# Patient Record
Sex: MALE | Race: WHITE | Employment: OTHER | URBAN - METROPOLITAN AREA
[De-identification: names, ages, dates, MRNs, and addresses within clinical notes are randomized per-mention and may not be internally consistent; named-entity substitution may affect disease eponyms.]

---

## 2017-05-02 ENCOUNTER — OFFICE VISIT (OUTPATIENT)
Dept: CARDIOLOGY CLINIC | Age: 77
End: 2017-05-02

## 2017-05-02 VITALS
BODY MASS INDEX: 24.11 KG/M2 | HEART RATE: 52 BPM | OXYGEN SATURATION: 98 % | RESPIRATION RATE: 16 BRPM | SYSTOLIC BLOOD PRESSURE: 130 MMHG | WEIGHT: 178 LBS | HEIGHT: 72 IN | DIASTOLIC BLOOD PRESSURE: 70 MMHG

## 2017-05-02 DIAGNOSIS — R42 DIZZINESS: Primary | ICD-10-CM

## 2017-05-02 NOTE — PROGRESS NOTES
HISTORY OF PRESENT ILLNESS  Kemal Buchanan is a 64 y.o. male. Patient with h/o HTN, HLD here for evaluation of BP elevation   Past Medical History:   Diagnosis Date    Essential hypertension     Hyperlipidemia      History reviewed. No pertinent surgical history. Social History     Social History    Marital status:      Spouse name: N/A    Number of children: N/A    Years of education: N/A     Occupational History    Not on file. Social History Main Topics    Smoking status: Never Smoker    Smokeless tobacco: Not on file    Alcohol use Not on file    Drug use: Not on file    Sexual activity: Not on file     Other Topics Concern    Not on file     Social History Narrative       HPI  He had one episode of mild dizziness about 1 month ago while out of bed but no recurrence since then. He denies any cp or sob or syncope or palpitations  He is a marathon runner! Ran [place] in 2016 in 5:01!! Review of Systems   Respiratory: Negative. Cardiovascular: Negative. Visit Vitals    /70 (BP 1 Location: Left arm, BP Patient Position: Sitting)    Pulse (!) 52    Resp 16    Ht 6' (1.829 m)    Wt 80.7 kg (178 lb)    SpO2 98%    BMI 24.14 kg/m2       Physical Exam   Neck: No JVD present. Carotid bruit is not present. Cardiovascular: Regular rhythm. Bradycardia present. Pulmonary/Chest: Effort normal and breath sounds normal.   Abdominal: Soft. Musculoskeletal: He exhibits no edema. Psychiatric: He has a normal mood and affect. Current Outpatient Prescriptions on File Prior to Visit   Medication Sig Dispense Refill    pravastatin (PRAVACHOL) 40 mg tablet Take 40 mg by mouth nightly.  finasteride (PROSCAR) 5 mg tablet Take 5 mg by mouth daily.  latanoprost (XALATAN) 0.005 % ophthalmic solution Administer 1 Drop to both eyes nightly.  multivitamin (ONE A DAY) tablet Take 1 Tab by mouth daily.       sumatriptan (IMITREX) 50 mg tablet Take 50 mg by mouth once as needed. No current facility-administered medications on file prior to visit. ASSESSMENT and PLAN  HTN: I have reviewed his BP log and for the most part is <140 with one episode of 144, now completely asymptomatic. We have also discussed potential for conduction disease. His ECG shows SB at 49, IVCD and no changes from 2014 ECG!  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

## 2017-05-02 NOTE — MR AVS SNAPSHOT
Visit Information Date & Time Provider Department Dept. Phone Encounter #  
 5/2/2017 10:00 AM Deanna Hicks MD [facility] [phone] [id number] Your Appointments 5/2/2017 10:00 AM  
ESTABLISHED PATIENT with Deanna Hicks MD  
[facility] ([address]) Appt Note: check up; check up/Pt requested Dr. Hegla Gomez 4/24 600 [address] [place] [address]  
[address] [facility] [address] [id number] Upcoming Health Maintenance Date Due DTaP/Tdap/Td series (1 - Tdap) 12/14/1959 ZOSTER VACCINE AGE 60> 12/14/1998 GLAUCOMA SCREENING Q2Y 12/14/2003 Pneumococcal 65+ Low/Medium Risk (1 of 2 - PCV13) 12/14/2003 MEDICARE YEARLY EXAM 12/14/2003 INFLUENZA AGE 9 TO ADULT 8/1/2017 Allergies as of 5/2/2017  Review Complete On: 11/13/2014 By: Arianne Bermeo MD  
 No Known Allergies Current Immunizations  Never Reviewed No immunizations on file. Not reviewed this visit Vitals Smoking Status Never Smoker Your Updated Medication List  
  
   
This list is accurate as of: 5/1/17  5:36 PM.  Always use your most recent med list.  
  
  
  
  
 finasteride 5 mg tablet Commonly known as:  PROSCAR Take 5 mg by mouth daily. latanoprost 0.005 % ophthalmic solution Commonly known as:  Allyssa Nap Administer 1 Drop to both eyes nightly. multivitamin tablet Commonly known as:  ONE A DAY Take 1 Tab by mouth daily. PRAVACHOL 40 mg tablet Generic drug:  pravastatin Take 40 mg by mouth nightly. SUMAtriptan 50 mg tablet Commonly known as:  IMITREX Take 50 mg by mouth once as needed. Introducing [facility]! Hannah Cortez introduces [employer] patient portal. Now you can access parts of your medical record, email your doctor's office, and request medication refills online. 1. In your internet browser, go to [email or web address] 2. Click on the First Time User? Click Here link in the Sign In box. You will see the New Member Sign Up page. 3. Enter your eoSemi Access Code exactly as it appears below. You will not need to use this code after youve completed the sign-up process. If you do not sign up before the expiration date, you must request a new code. · [employer] Access Code: [email or web address] Expires: 7/30/2017  5:36 PM 
 
4. Enter the last four digits of your Social Security Number (xxxx) and Date of Birth (mm/dd/yyyy) as indicated and click Submit. You will be taken to the next sign-up page. 5. Create a Enclarityt ID. This will be your eoSemi login ID and cannot be changed, so think of one that is secure and easy to remember. 6. Create a e[employer]i password. You can change your password at any time. 7. Enter your Password Reset Question and Answer. This can be used at a later time if you forget your password. 8. Enter your e-mail address. You will receive e-mail notification when new information is available in [address] 9. Click Sign Up. You can now view and download portions of your medical record. 10. Click the Download Summary menu link to download a portable copy of your medical information. If you have questions, please visit the Frequently Asked Questions section of the e[employer]i website. Remember, eoSemi is NOT to be used for urgent needs. For medical emergencies, dial 911. Now available from your iPhone and Android! Please provide this summary of care documentation to your next provider. Your primary care clinician is listed as Claudette Abdullahi. If you have any questions after today's visit, please call [phone].

## 2017-08-15 ENCOUNTER — HOSPITAL ENCOUNTER (OUTPATIENT)
Dept: GENERAL RADIOLOGY | Age: 77
Discharge: HOME | End: 2017-08-15
Payer: MEDICARE

## 2017-08-15 DIAGNOSIS — R13.10 DYSPHAGIA: ICD-10-CM

## 2017-08-15 PROCEDURE — 74220 X-RAY XM ESOPHAGUS 1CNTRST: CPT

## 2017-10-05 ENCOUNTER — HOSPITAL ENCOUNTER (EMERGENCY)
Age: 77
Discharge: HOME | End: 2017-10-05
Attending: FAMILY MEDICINE

## 2017-10-05 VITALS
WEIGHT: 174 LBS | TEMPERATURE: 98 F | RESPIRATION RATE: 19 BRPM | HEART RATE: 60 BPM | SYSTOLIC BLOOD PRESSURE: 143 MMHG | OXYGEN SATURATION: 97 % | HEIGHT: 72 IN | DIASTOLIC BLOOD PRESSURE: 79 MMHG | BODY MASS INDEX: 23.57 KG/M2

## 2017-10-05 DIAGNOSIS — H00.025 HORDEOLUM INTERNUM LEFT LOWER EYELID: Primary | ICD-10-CM

## 2017-10-05 RX ORDER — CIPROFLOXACIN 500 MG/1
500 TABLET ORAL 2 TIMES DAILY
Qty: 20 TAB | Refills: 0 | Status: SHIPPED | OUTPATIENT
Start: 2017-10-05 | End: 2017-10-15

## 2017-10-05 NOTE — UC PROVIDER NOTE
Patient is a 64 y.o. male presenting with eye problem. The history is provided by the patient. Eye Problem    This is a new problem. The current episode started more than 2 days ago. The left eye is affected. The injury mechanism was none. There is no history of trauma to the eye. There is no known exposure to pink eye. He does not wear contacts. Associated symptoms include eye redness. Pertinent negatives include no blurred vision, no foreign body sensation and no fever. He has tried nothing for the symptoms. Past Medical History:   Diagnosis Date    Essential hypertension     Hyperlipidemia         History reviewed. No pertinent surgical history. History reviewed. No pertinent family history. Social History     Social History    Marital status:      Spouse name: N/A    Number of children: N/A    Years of education: N/A     Occupational History    Not on file. Social History Main Topics    Smoking status: Never Smoker    Smokeless tobacco: Never Used    Alcohol use Not on file    Drug use: Not on file    Sexual activity: Not on file     Other Topics Concern    Not on file     Social History Narrative                ALLERGIES: Review of patient's allergies indicates no known allergies. Review of Systems   Constitutional: Negative for fever. Eyes: Positive for redness. Negative for blurred vision. Neurological: Negative for headaches. Vitals:    10/05/17 0925   BP: 143/79   Pulse: 60   Resp: 19   Temp: 98 °F (36.7 °C)   SpO2: 97%   Weight: 78.9 kg (174 lb)   Height: 6' (1.829 m)       Physical Exam   Constitutional: He is oriented to person, place, and time. He appears well-developed and well-nourished. HENT:   Head: Normocephalic and atraumatic. Eyes: Conjunctivae and EOM are normal.   Medial side of left lower lid has internal stye   Cardiovascular: Normal rate and regular rhythm.     Pulmonary/Chest: Effort normal and breath sounds normal.   Neurological: He is alert and oriented to person, place, and time. Skin: Skin is warm and dry. Psychiatric: He has a normal mood and affect. His behavior is normal. Judgment and thought content normal.   Nursing note and vitals reviewed. MDM     Differential Diagnosis; Clinical Impression; Plan:     CLINICAL IMPRESSION:  Hordeolum internum left lower eyelid  (primary encounter diagnosis)    Plan:  1. cipro  2.   3.   Risk of Significant Complications, Morbidity, and/or Mortality:   Presenting problems: Moderate  Diagnostic procedures: Moderate  Management options:   Moderate  Progress:   Patient progress:  Stable      Procedures

## 2017-10-05 NOTE — DISCHARGE INSTRUCTIONS
Styes and Chalazia: Care Instructions  Your Care Instructions    Styes and chalazia (say \"anr-TAK-imm-uh\") are both conditions that can cause swelling of the eyelid. A stye is an infection in the root of an eyelash. The infection causes a tender red lump on the edge of the eyelid. The infection can spread until the whole eyelid becomes red and inflamed. Styes usually break open, and a tiny amount of pus drains. They usually clear up on their own in about a week, but they sometimes need treatment with antibiotics. A chalazion is a lump or cyst in the eyelid (chalazion is singular; chalazia is plural). It is caused by swelling and inflammation of deep oil glands inside the eyelid. Chalazia are usually not infected. They can take a few months to heal.  If a chalazion becomes more swollen and painful or does not go away, you may need to have it drained by your doctor. Follow-up care is a key part of your treatment and safety. Be sure to make and go to all appointments, and call your doctor if you are having problems. It's also a good idea to know your test results and keep a list of the medicines you take. How can you care for yourself at home? · Do not rub your eyes. Do not squeeze or try to open a stye or chalazion. · To help a stye or chalazion heal faster:  ¨ Put a warm, moist compress on your eye for 5 to 10 minutes, 3 to 6 times a day. Heat often brings a stye to a point where it drains on its own. Keep in mind that warm compresses will often increase swelling a little at first.  ¨ Do not use hot water or heat a wet cloth in a microwave oven. The compress may get too hot and can burn the eyelid. · Always wash your hands before and after you use a compress or touch your eyes. · If the doctor gave you antibiotic drops or ointment, use the medicine exactly as directed. Use the medicine for as long as instructed, even if your eye starts to feel better.   · To put in eyedrops or ointment:  ¨ Tilt your head

## 2018-01-22 ENCOUNTER — HOSPITAL ENCOUNTER (EMERGENCY)
Age: 78
Discharge: STILL ADMITTED OR ADMINISTRATIVE | End: 2018-01-22
Attending: FAMILY MEDICINE

## 2018-08-10 ENCOUNTER — OFFICE VISIT (OUTPATIENT)
Dept: URGENT CARE | Age: 78
End: 2018-08-10

## 2018-08-10 VITALS
WEIGHT: 175.9 LBS | BODY MASS INDEX: 23.83 KG/M2 | DIASTOLIC BLOOD PRESSURE: 75 MMHG | HEIGHT: 72 IN | SYSTOLIC BLOOD PRESSURE: 146 MMHG | TEMPERATURE: 97.7 F | HEART RATE: 50 BPM | RESPIRATION RATE: 16 BRPM | OXYGEN SATURATION: 98 %

## 2018-08-10 DIAGNOSIS — S46.009A ROTATOR CUFF INJURY, INITIAL ENCOUNTER: Primary | ICD-10-CM

## 2018-08-10 DIAGNOSIS — M25.512 ACUTE PAIN OF LEFT SHOULDER: ICD-10-CM

## 2018-08-10 RX ORDER — LANOLIN ALCOHOL/MO/W.PET/CERES
1 CREAM (GRAM) TOPICAL
COMMUNITY

## 2018-08-10 RX ORDER — OMEPRAZOLE 20 MG/1
40 CAPSULE, DELAYED RELEASE ORAL DAILY
COMMUNITY

## 2018-08-10 RX ORDER — DUTASTERIDE 0.5 MG/1
CAPSULE, LIQUID FILLED ORAL
COMMUNITY
Start: 2017-09-18

## 2018-08-10 RX ORDER — LOSARTAN POTASSIUM 50 MG/1
TABLET ORAL DAILY
COMMUNITY

## 2018-08-10 NOTE — MR AVS SNAPSHOT
[address] [facility] [address] 
[phone] Patient: Sharon Buchanan MRN: [id number] CJF:[id number] Visit Information Date & Time Provider Department Dept. Phone Encounter #  
 8/10/2018  3:30 PM [address] [facility] [phone] [id number] Upcoming Health Maintenance Date Due DTaP/Tdap/Td series (1 - Tdap) 12/14/1959 ZOSTER VACCINE AGE 60> 10/14/1998 GLAUCOMA SCREENING Q2Y 12/14/2003 Pneumococcal 65+ Low/Medium Risk (1 of 2 - PCV13) 12/14/2003 MEDICARE YEARLY EXAM 3/20/2018 Influenza Age 5 to Adult 8/1/2018 Allergies as of 8/10/2018  Review Complete On: 8/10/2018 By: Allison Wiggins NP No Known Allergies Current Immunizations  Never Reviewed No immunizations on file. Not reviewed this visit You Were Diagnosed With   
  
 Codes Comments Rotator cuff injury, initial encounter    -  Primary ICD-10-CM: Z94.535M ICD-9-CM: 959.2 Acute pain of left shoulder     ICD-10-CM: M25.512 ICD-9-CM: 719.41 Vitals BP Pulse Temp Resp Height(growth percentile) Weight(growth percentile) 146/75 (!) 50 97.7 °F (36.5 °C) 16 6' (1.829 m) 175 lb 14.4 oz (79.8 kg) SpO2 BMI Smoking Status 98% 23.86 kg/m2 Never Smoker BMI and BSA Data Body Mass Index Body Surface Area  
 23.86 kg/m 2 2.01 m 2 Preferred Pharmacy Pharmacy Name Phone [facility], [address] [place] [address] AT [address] [place] [address] Drive [phone] Your Updated Medication List  
  
   
This list is accurate as of 8/10/18  4:52 PM.  Always use your most recent med list.  
  
  
  
  
 dutasteride 0.5 mg capsule Commonly known as:  AVODART  
  
 finasteride 5 mg tablet Commonly known as:  PROSCAR Take 5 mg by mouth daily. glucosamine-chondroitin 500-400 mg Cap Commonly known as:  20 [facility] Take 1 Tab by mouth. latanoprost 0.005 % ophthalmic solution Commonly known as:  Rupesh Boyd Administer 1 Drop to both eyes nightly. losartan 50 mg tablet Commonly known as:  COZAAR Take  by mouth daily. multivitamin tablet Commonly known as:  ONE A DAY Take 1 Tab by mouth daily. omeprazole 20 mg capsule Commonly known as:  PRILOSEC Take 20 mg by mouth daily. PRAVACHOL 40 mg tablet Generic drug:  pravastatin Take 40 mg by mouth nightly. SUMAtriptan 50 mg tablet Commonly known as:  IMITREX Take 50 mg by mouth once as needed. To-Do List   
 08/10/2018 Imaging:  XR SHOULDER LT AP/LAT MIN 2 V Patient Instructions Follow up with [address] [place] [address] without some improvement over the next 1 week. Rotator Cuff: Exercises Your Care Instructions Here are some examples of typical rehabilitation exercises for your condition. Start each exercise slowly. Ease off the exercise if you start to have pain. Your doctor or physical therapist will tell you when you can start these exercises and which ones will work best for you. How to do the exercises Pendulum swing If you have pain in your back, do not do this exercise. 1. Hold on to a table or the back of a chair with your good arm. Then bend forward a little and let your sore arm hang straight down. This exercise does not use the arm muscles. Rather, use your legs and your hips to create movement that makes your arm swing freely. 2. Use the movement from your hips and legs to guide the slightly swinging arm back and forth like a pendulum (or elephant trunk). Then guide it in circles that start small (about the size of a dinner plate). Make the circles a bit larger each day, as your pain allows. 3. Do this exercise for 5 minutes, 5 to 7 times each day. 4. As you have less pain, try bending over a little farther to do this exercise. This will increase the amount of movement at your shoulder. Posterior stretching exercise 1. Hold the elbow of your injured arm with your other hand. 2. Use your hand to pull your injured arm gently up and across your body. You will feel a gentle stretch across the back of your injured shoulder. 3. Hold for at least 15 to 30 seconds. Then slowly lower your arm. 4. Repeat 2 to 4 times. Up-the-back stretch Your doctor or physical therapist may want you to wait to do this stretch until you have regained most of your range of motion and strength. You can do this stretch in different ways. Hold any of these stretches for at least 15 to 30 seconds. Repeat them 2 to 4 times. 1. Put your hand in your back pocket. Let it rest there to stretch your shoulder. 2. With your other hand, hold your injured arm (palm outward) behind your back by the wrist. Pull your arm up gently to stretch your shoulder. 3. Next, put a towel over your other shoulder. Put the hand of your injured arm behind your back. Now hold the back end of the towel. With the other hand, hold the front end of the towel in front of your body. Pull gently on the front end of the towel. This will bring your hand farther up your back to stretch your shoulder. Overhead stretch 1. Standing about an arm's length away, grasp onto a solid surface. You could use a countertop, a doorknob, or the back of a sturdy chair. 2. With your knees slightly bent, bend forward with your arms straight. Lower your upper body, and let your shoulders stretch. 3. As your shoulders are able to stretch farther, you may need to take a step or two backward. 4. Hold for at least 15 to 30 seconds. Then stand up and relax. If you had stepped back during your stretch, step forward so you can keep your hands on the solid surface. 5. Repeat 2 to 4 times. Shoulder flexion (lying down) To make a wand for this exercise, use a piece of PVC pipe or a broom handle with the broom removed.  Make the wand about a foot wider than your shoulders. 1. Lie on your back, holding a wand with both hands. Your palms should face down as you hold the wand. 2. Keeping your elbows straight, slowly raise your arms over your head. Raise them until you feel a stretch in your shoulders, upper back, and chest. 
3. Hold for 15 to 30 seconds. 4. Repeat 2 to 4 times. Shoulder rotation (lying down) To make a wand for this exercise, use a piece of PVC pipe or a broom handle with the broom removed. Make the wand about a foot wider than your shoulders. 1. Lie on your back. Hold a wand with both hands with your elbows bent and palms up. 2. Keep your elbows close to your body, and move the wand across your body toward the sore arm. 3. Hold for 8 to 12 seconds. 4. Repeat 2 to 4 times. Wall climbing (to the side) Avoid any movement that is straight to your side, and be careful not to arch your back. Your arm should stay about 30 degrees to the front of your side. 1. Stand with your side to a wall so that your fingers can just touch it at an angle about 30 degrees toward the front of your body. 2. Walk the fingers of your injured arm up the wall as high as pain permits. Try not to shrug your shoulder up toward your ear as you move your arm up. 3. Hold that position for a count of at least 15 to 20. 
4. Walk your fingers back down to the starting position. 5. Repeat at least 2 to 4 times. Try to reach higher each time. Wall climbing (to the front) During this stretching exercise, be careful not to arch your back. 1. Face a wall, and stand so your fingers can just touch it. 2. Keeping your shoulder down, walk the fingers of your injured arm up the wall as high as pain permits. (Don't shrug your shoulder up toward your ear.) 3. Hold your arm in that position for at least 15 to 30 seconds. 4. Slowly walk your fingers back down to where you started. 5. Repeat at least 2 to 4 times. Try to reach higher each time. Shoulder blade squeeze 1. Stand with your arms at your sides, and squeeze your shoulder blades together. Do not raise your shoulders up as you squeeze. 2. Hold 6 seconds. 3. Repeat 8 to 12 times. Scapular exercise: Arm reach 1. Lie flat on your back. This exercise is a very slight motion that starts with your arms raised (elbows straight, arms straight). 2. From this position, reach higher toward the kristyn or ceiling. Keep your elbows straight. All motion should be from your shoulder blade only. 3. Relax your arms back to where you started. 4. Repeat 8 to 12 times. Arm raise to the side During this strengthening exercise, your arm should stay about 30 degrees to the front of your side. 1. Slowly raise your injured arm to the side, with your thumb facing up. Raise your arm 60 degrees at the most (shoulder level is 90 degrees). 2. Hold the position for 3 to 5 seconds. Then lower your arm back to your side. If you need to, bring your \"good\" arm across your body and place it under the elbow as you lower your injured arm. Use your good arm to keep your injured arm from dropping down too fast. 
3. Repeat 8 to 12 times. 4. When you first start out, don't hold any extra weight in your hand. As you get stronger, you may use a 1-pound to 2-pound dumbbell or a small can of food. Shoulder flexor and extensor exercise These are isometric exercises. That means you contract your muscles without actually moving. 1. Push forward (flex): Stand facing a wall or doorjamb, about 6 inches or less back. Hold your injured arm against your body. Make a closed fist with your thumb on top. Then gently push your hand forward into the wall with about 25% to 50% of your strength. Don't let your body move backward as you push. Hold for about 6 seconds. Relax for a few seconds. Repeat 8 to 12 times. 2. Push backward (extend): Stand with your back flat against a wall.  Your upper arm should be against the wall, with your elbow bent 90 degrees (your hand straight ahead). Push your elbow gently back against the wall with about 25% to 50% of your strength. Don't let your body move forward as you push. Hold for about 6 seconds. Relax for a few seconds. Repeat 8 to 12 times. Scapular exercise: Wall push-ups This exercise is best done with your fingers somewhat turned out, rather than straight up and down. 1. Stand facing a wall, about 12 inches to 18 inches away. 2. Place your hands on the wall at shoulder height. 3. Slowly bend your elbows and bring your face to the wall. Keep your back and hips straight. 4. Push back to where you started. 5. Repeat 8 to 12 times. 6. When you can do this exercise against a wall comfortably, you can try it against a counter. You can then slowly progress to the end of a couch, then to a sturdy chair, and finally to the floor. Scapular exercise: Retraction For this exercise, you will need elastic exercise material, such as surgical tubing or Thera-Band. 1. Put the band around a solid object at about waist level. (A bedpost will work well.) Each hand should hold an end of the band. 2. With your elbows at your sides and bent to 90 degrees, pull the band back. Your shoulder blades should move toward each other. Then move your arms back where you started. 3. Repeat 8 to 12 times. 4. If you have good range of motion in your shoulders, try this exercise with your arms lifted out to the sides. Keep your elbows at a 90-degree angle. Raise the elastic band up to about shoulder level. Pull the band back to move your shoulder blades toward each other. Then move your arms back where you started. Internal rotator strengthening exercise 1. Start by tying a piece of elastic exercise material to a doorknob. You can use surgical tubing or Thera-Band. 2. Stand or sit with your shoulder relaxed and your elbow bent 90 degrees. Your upper arm should rest comfortably against your side.  Squeeze a rolled towel between your elbow and your body for comfort. This will help keep your arm at your side. 3. Hold one end of the elastic band in the hand of the painful arm. 4. Slowly rotate your forearm toward your body until it touches your belly. Slowly move it back to where you started. 5. Keep your elbow and upper arm firmly tucked against the towel roll or at your side. 6. Repeat 8 to 12 times. External rotator strengthening exercise 1. Start by tying a piece of elastic exercise material to a doorknob. You can use surgical tubing or Thera-Band. (You may also hold one end of the band in each hand.) 2. Stand or sit with your shoulder relaxed and your elbow bent 90 degrees. Your upper arm should rest comfortably against your side. Squeeze a rolled towel between your elbow and your body for comfort. This will help keep your arm at your side. 3. Hold one end of the elastic band with the hand of the painful arm. 4. Start with your forearm across your belly. Slowly rotate the forearm out away from your body. Keep your elbow and upper arm tucked against the towel roll or the side of your body until you begin to feel tightness in your shoulder. Slowly move your arm back to where you started. 5. Repeat 8 to 12 times. Follow-up care is a key part of your treatment and safety. Be sure to make and go to all appointments, and call your doctor if you are having problems. It's also a good idea to know your test results and keep a list of the medicines you take. Where can you learn more? Go to http://maria ines-katherine.info/. Enter Magalie Clifton in the search box to learn more about \"Rotator Cuff: Exercises. \" Current as of: November 29, 2017 Content Version: 11.7 © 1994-3914 [employer]. Care instructions adapted under license by [employer] (which disclaims liability or warranty for this information).  If you have questions about a medical condition or this instruction, always ask your healthcare professional. [address] any warranty or liability for your use of this information. Rotator Cuff Rehabilitation What is rotator cuff rehabilitation? Rotator cuff rehabilitation is a series of exercises you do after your surgery. It helps you get back your shoulder's range of motion and strength. You will work with your doctor and physical therapist to plan this exercise program. To get the best results, you need to do the exercises correctly and as often as your doctor tells you. Before you start any exercises, talk with your doctor or physical therapist. It is important that you know exactly how to do the exercises. Stop and call your doctor if you are not sure that you are doing the exercises correctly or if you have any pain. Hearing clicks and pops during exercise is not always cause for concern, but a grinding feeling may mean a more serious problem. Ice your shoulder after exercising if it is sore. Follow-up care is a key part of your treatment and safety. Be sure to make and go to all appointments, and call your doctor if you are having problems. It's also a good idea to know your test results and keep a list of the medicines you take. Stretching exercises Do not start doing stretching exercises until your doctor says you can. Your doctor will tell you which exercises to do, and how often and how long to do them. Posterior stretch · Stand upright with your feet shoulder-width apart. · Put the hand of your affected arm on the opposite shoulder, and hold the elbow to your body. · Then, using your good arm, hold the elbow of your affected arm and move it gently up, away from, and across your body. External rotation · Hold a lightweight stick or cheng in your good arm. It should be about 2 feet long. A curtain cheng may work well. · Lie on your back with your elbows next to your sides.  Rest the elbow of your affected arm on a small pillow or folded towel. · Set your arms so that the elbows are bent at a 90-degree angle, like the letter \"L. \" Your hands will point straight up. · Hold the stick with both hands. Use your good arm to push the stick toward the affected arm so the affected arm moves outward, away from your body. Stop when you feel the arm stretching. Strength exercises Do not start strength exercises until your doctor says you can. Usually, this is at least 6 to 8 weeks after surgery. Your doctor will tell you how often and how long to do the exercises. Arm raises to the side · Stand upright with your feet shoulder-width apart and your affected arm at your side. · Slowly raise your injured arm to the side, with your thumb facing up. Raise your arm 60 degrees at the most (shoulder level is 90 degrees). · After holding the position for 3 to 5 seconds, lower your arm back to your side. If you need to, bring your \"good\" arm across your body and place it under the elbow as you lower your injured arm. Use your good arm to keep your injured arm from dropping down too fast during the downward motion. · Repeat 8 to 12 times. · When you first start out, don't hold any additional weight in your hand. As your strength improves, you may use a 1- to 2-pound dumbbell or a small can of food. Shoulder flexor · Stand facing a wall. Your body should be about 6 inches away from the wall. · Keep your affected arm and elbow to your side, and bend your elbow so that your arm is pointing toward the wall. · Make a closed fist with your thumb on top. · Push your hand into the wall and hold it for 6 seconds. Push with 25% to 50% of the force you have. Shoulder extension · Stand with your back flat against a wall. · Keep your affected arm and elbow at your side, and bend your elbow so that your upper arm is against the wall and your lower arm is pointing straight ahead. Make a closed fist with your thumb on top. · Push your elbow gently back against the wall, holding for 6 seconds. Push with 25% to 50% of the force you have. Where can you learn more? Go to http://maria ines-katherine.info/. Enter N047 in the search box to learn more about \"Rotator Cuff Rehabilitation. \" Current as of: November 29, 2017 Content Version: 11.7 © 6743-7773 [email or web address]. Care instructions adapted under license by [employer] (which disclaims liability or warranty for this information). If you have questions about a medical condition or this instruction, always ask your healthcare professional. [address] any warranty or liability for your use of this information. Introducing [facility]! Gael Lim introduces [employer]park patient portal. Now you can access parts of your medical record, email your doctor's office, and request medication refills online. 1. In your internet browser, go to [email or web address] 2. Click on the First Time User? Click Here link in the Sign In box. You will see the New Member Sign Up page. 3. Enter your xoompark Access Code exactly as it appears below. You will not need to use this code after youve completed the sign-up process. If you do not sign up before the expiration date, you must request a new code. · [employer] Access Code: [email or web address] Expires: 11/8/2018  4:51 PM 
 
4. Enter the last four digits of your Social Security Number (xxxx) and Date of Birth (mm/dd/yyyy) as indicated and click Submit. You will be taken to the next sign-up page. 5. Create a [employer] ID. This will be your xoompark login ID and cannot be changed, so think of one that is secure and easy to remember. 6. Create a xoompark password. You can change your password at any time. 7. Enter your Password Reset Question and Answer. This can be used at a later time if you forget your password. 8. Enter your e-mail address. You will receive e-mail notification when new information is available in [address] 9. Click Sign Up. You can now view and download portions of your medical record. 10. Click the Download Summary menu link to download a portable copy of your medical information. If you have questions, please visit the Frequently Asked Questions section of the [employer] website. Remember, [employer] is NOT to be used for urgent needs. For medical emergencies, dial 911. Now available from your iPhone and Android! Please provide this summary of care documentation to your next provider. Your primary care clinician is listed as Donald Oneill. If you have any questions after today's visit, please call [phone].

## 2018-08-10 NOTE — PATIENT INSTRUCTIONS
Follow up with [address] [place] [address] without some improvement over the next 1 week. Rotator Cuff: Exercises  Your Care Instructions  Here are some examples of typical rehabilitation exercises for your condition. Start each exercise slowly. Ease off the exercise if you start to have pain. Your doctor or physical therapist will tell you when you can start these exercises and which ones will work best for you. How to do the exercises  Pendulum swing    If you have pain in your back, do not do this exercise. 1. Hold on to a table or the back of a chair with your good arm. Then bend forward a little and let your sore arm hang straight down. This exercise does not use the arm muscles. Rather, use your legs and your hips to create movement that makes your arm swing freely. 2. Use the movement from your hips and legs to guide the slightly swinging arm back and forth like a pendulum (or elephant trunk). Then guide it in circles that start small (about the size of a dinner plate). Make the circles a bit larger each day, as your pain allows. 3. Do this exercise for 5 minutes, 5 to 7 times each day. 4. As you have less pain, try bending over a little farther to do this exercise. This will increase the amount of movement at your shoulder. Posterior stretching exercise    1. Hold the elbow of your injured arm with your other hand. 2. Use your hand to pull your injured arm gently up and across your body. You will feel a gentle stretch across the back of your injured shoulder. 3. Hold for at least 15 to 30 seconds. Then slowly lower your arm. 4. Repeat 2 to 4 times. Up-the-back stretch    Your doctor or physical therapist may want you to wait to do this stretch until you have regained most of your range of motion and strength. You can do this stretch in different ways. Hold any of these stretches for at least 15 to 30 seconds. Repeat them 2 to 4 times. 1. Put your hand in your back pocket.  Let it rest there to stretch your shoulder. 2. With your other hand, hold your injured arm (palm outward) behind your back by the wrist. Pull your arm up gently to stretch your shoulder. 3. Next, put a towel over your other shoulder. Put the hand of your injured arm behind your back. Now hold the back end of the towel. With the other hand, hold the front end of the towel in front of your body. Pull gently on the front end of the towel. This will bring your hand farther up your back to stretch your shoulder. Overhead stretch    1. Standing about an arm's length away, grasp onto a solid surface. You could use a countertop, a doorknob, or the back of a sturdy chair. 2. With your knees slightly bent, bend forward with your arms straight. Lower your upper body, and let your shoulders stretch. 3. As your shoulders are able to stretch farther, you may need to take a step or two backward. 4. Hold for at least 15 to 30 seconds. Then stand up and relax. If you had stepped back during your stretch, step forward so you can keep your hands on the solid surface. 5. Repeat 2 to 4 times. Shoulder flexion (lying down)    To make a wand for this exercise, use a piece of PVC pipe or a broom handle with the broom removed. Make the wand about a foot wider than your shoulders. 1. Lie on your back, holding a wand with both hands. Your palms should face down as you hold the wand. 2. Keeping your elbows straight, slowly raise your arms over your head. Raise them until you feel a stretch in your shoulders, upper back, and chest.  3. Hold for 15 to 30 seconds. 4. Repeat 2 to 4 times. Shoulder rotation (lying down)    To make a wand for this exercise, use a piece of PVC pipe or a broom handle with the broom removed. Make the wand about a foot wider than your shoulders. 1. Lie on your back. Hold a wand with both hands with your elbows bent and palms up.   2. Keep your elbows close to your body, and move the wand across your body toward the sore arm.  3. Hold for 8 to 12 seconds. 4. Repeat 2 to 4 times. Wall climbing (to the side)    Avoid any movement that is straight to your side, and be careful not to arch your back. Your arm should stay about 30 degrees to the front of your side. 1. Stand with your side to a wall so that your fingers can just touch it at an angle about 30 degrees toward the front of your body. 2. Walk the fingers of your injured arm up the wall as high as pain permits. Try not to shrug your shoulder up toward your ear as you move your arm up. 3. Hold that position for a count of at least 15 to 20.  4. Walk your fingers back down to the starting position. 5. Repeat at least 2 to 4 times. Try to reach higher each time. Wall climbing (to the front)    During this stretching exercise, be careful not to arch your back. 1. Face a wall, and stand so your fingers can just touch it. 2. Keeping your shoulder down, walk the fingers of your injured arm up the wall as high as pain permits. (Don't shrug your shoulder up toward your ear.)  3. Hold your arm in that position for at least 15 to 30 seconds. 4. Slowly walk your fingers back down to where you started. 5. Repeat at least 2 to 4 times. Try to reach higher each time. Shoulder blade squeeze    1. Stand with your arms at your sides, and squeeze your shoulder blades together. Do not raise your shoulders up as you squeeze. 2. Hold 6 seconds. 3. Repeat 8 to 12 times. Scapular exercise: Arm reach    1. Lie flat on your back. This exercise is a very slight motion that starts with your arms raised (elbows straight, arms straight). 2. From this position, reach higher toward the kristyn or ceiling. Keep your elbows straight. All motion should be from your shoulder blade only. 3. Relax your arms back to where you started. 4. Repeat 8 to 12 times. Arm raise to the side    During this strengthening exercise, your arm should stay about 30 degrees to the front of your side.   1. Slowly raise your injured arm to the side, with your thumb facing up. Raise your arm 60 degrees at the most (shoulder level is 90 degrees). 2. Hold the position for 3 to 5 seconds. Then lower your arm back to your side. If you need to, bring your \"good\" arm across your body and place it under the elbow as you lower your injured arm. Use your good arm to keep your injured arm from dropping down too fast.  3. Repeat 8 to 12 times. 4. When you first start out, don't hold any extra weight in your hand. As you get stronger, you may use a 1-pound to 2-pound dumbbell or a small can of food. Shoulder flexor and extensor exercise    These are isometric exercises. That means you contract your muscles without actually moving. 1. Push forward (flex): Stand facing a wall or doorjamb, about 6 inches or less back. Hold your injured arm against your body. Make a closed fist with your thumb on top. Then gently push your hand forward into the wall with about 25% to 50% of your strength. Don't let your body move backward as you push. Hold for about 6 seconds. Relax for a few seconds. Repeat 8 to 12 times. 2. Push backward (extend): Stand with your back flat against a wall. Your upper arm should be against the wall, with your elbow bent 90 degrees (your hand straight ahead). Push your elbow gently back against the wall with about 25% to 50% of your strength. Don't let your body move forward as you push. Hold for about 6 seconds. Relax for a few seconds. Repeat 8 to 12 times. Scapular exercise: Wall push-ups    This exercise is best done with your fingers somewhat turned out, rather than straight up and down. 1. Stand facing a wall, about 12 inches to 18 inches away. 2. Place your hands on the wall at shoulder height. 3. Slowly bend your elbows and bring your face to the wall. Keep your back and hips straight. 4. Push back to where you started. 5. Repeat 8 to 12 times.   6. When you can do this exercise against a wall comfortably, you can try it against a counter. You can then slowly progress to the end of a couch, then to a sturdy chair, and finally to the floor. Scapular exercise: Retraction    For this exercise, you will need elastic exercise material, such as surgical tubing or Thera-Band. 1. Put the band around a solid object at about waist level. (A bedpost will work well.) Each hand should hold an end of the band. 2. With your elbows at your sides and bent to 90 degrees, pull the band back. Your shoulder blades should move toward each other. Then move your arms back where you started. 3. Repeat 8 to 12 times. 4. If you have good range of motion in your shoulders, try this exercise with your arms lifted out to the sides. Keep your elbows at a 90-degree angle. Raise the elastic band up to about shoulder level. Pull the band back to move your shoulder blades toward each other. Then move your arms back where you started. Internal rotator strengthening exercise    1. Start by tying a piece of elastic exercise material to a doorknob. You can use surgical tubing or Thera-Band. 2. Stand or sit with your shoulder relaxed and your elbow bent 90 degrees. Your upper arm should rest comfortably against your side. Squeeze a rolled towel between your elbow and your body for comfort. This will help keep your arm at your side. 3. Hold one end of the elastic band in the hand of the painful arm. 4. Slowly rotate your forearm toward your body until it touches your belly. Slowly move it back to where you started. 5. Keep your elbow and upper arm firmly tucked against the towel roll or at your side. 6. Repeat 8 to 12 times. External rotator strengthening exercise    1. Start by tying a piece of elastic exercise material to a doorknob. You can use surgical tubing or Thera-Band. (You may also hold one end of the band in each hand.)  2. Stand or sit with your shoulder relaxed and your elbow bent 90 degrees.  Your upper arm should rest comfortably against your side. Squeeze a rolled towel between your elbow and your body for comfort. This will help keep your arm at your side. 3. Hold one end of the elastic band with the hand of the painful arm. 4. Start with your forearm across your belly. Slowly rotate the forearm out away from your body. Keep your elbow and upper arm tucked against the towel roll or the side of your body until you begin to feel tightness in your shoulder. Slowly move your arm back to where you started. 5. Repeat 8 to 12 times. Follow-up care is a key part of your treatment and safety. Be sure to make and go to all appointments, and call your doctor if you are having problems. It's also a good idea to know your test results and keep a list of the medicines you take. Where can you learn more? Go to http://maria [employer] Softwarekatherine.info/. Enter Marina Youngblood in the search box to learn more about \"Rotator Cuff: Exercises. \"  Current as of: November 29, 2017  Content Version: 11.7  © 7296-7715 [employer]. Care instructions adapted under license by [employer] (which disclaims liability or warranty for this information). If you have questions about a medical condition or this instruction, always ask your healthcare professional. [address] any warranty or liability for your use of this information. Rotator Cuff Rehabilitation  What is rotator cuff rehabilitation? Rotator cuff rehabilitation is a series of exercises you do after your surgery. It helps you get back your shoulder's range of motion and strength. You will work with your doctor and physical therapist to plan this exercise program. To get the best results, you need to do the exercises correctly and as often as your doctor tells you. Before you start any exercises, talk with your doctor or physical therapist. It is important that you know exactly how to do the exercises.  Stop and call your doctor if you are not sure that you are doing the exercises correctly or if you have any pain. Hearing clicks and pops during exercise is not always cause for concern, but a grinding feeling may mean a more serious problem. Ice your shoulder after exercising if it is sore. Follow-up care is a key part of your treatment and safety. Be sure to make and go to all appointments, and call your doctor if you are having problems. It's also a good idea to know your test results and keep a list of the medicines you take. Stretching exercises  Do not start doing stretching exercises until your doctor says you can. Your doctor will tell you which exercises to do, and how often and how long to do them. Posterior stretch  · Stand upright with your feet shoulder-width apart. · Put the hand of your affected arm on the opposite shoulder, and hold the elbow to your body. · Then, using your good arm, hold the elbow of your affected arm and move it gently up, away from, and across your body. External rotation  · Hold a lightweight stick or cheng in your good arm. It should be about 2 feet long. A curtain cheng may work well. · Lie on your back with your elbows next to your sides. Rest the elbow of your affected arm on a small pillow or folded towel. · Set your arms so that the elbows are bent at a 90-degree angle, like the letter \"L. \" Your hands will point straight up. · Hold the stick with both hands. Use your good arm to push the stick toward the affected arm so the affected arm moves outward, away from your body. Stop when you feel the arm stretching. Strength exercises  Do not start strength exercises until your doctor says you can. Usually, this is at least 6 to 8 weeks after surgery. Your doctor will tell you how often and how long to do the exercises. Arm raises to the side  · Stand upright with your feet shoulder-width apart and your affected arm at your side. · Slowly raise your injured arm to the side, with your thumb facing up.  Raise your arm 60 degrees at the most (shoulder level is 90 degrees). · After holding the position for 3 to 5 seconds, lower your arm back to your side. If you need to, bring your \"good\" arm across your body and place it under the elbow as you lower your injured arm. Use your good arm to keep your injured arm from dropping down too fast during the downward motion. · Repeat 8 to 12 times. · When you first start out, don't hold any additional weight in your hand. As your strength improves, you may use a 1- to 2-pound dumbbell or a small can of food. Shoulder flexor  · Stand facing a wall. Your body should be about 6 inches away from the wall. · Keep your affected arm and elbow to your side, and bend your elbow so that your arm is pointing toward the wall. · Make a closed fist with your thumb on top. · Push your hand into the wall and hold it for 6 seconds. Push with 25% to 50% of the force you have. Shoulder extension  · Stand with your back flat against a wall. · Keep your affected arm and elbow at your side, and bend your elbow so that your upper arm is against the wall and your lower arm is pointing straight ahead. Make a closed fist with your thumb on top. · Push your elbow gently back against the wall, holding for 6 seconds. Push with 25% to 50% of the force you have. Where can you learn more? Go to http://maria ines-katherine.info/. Enter C942 in the search box to learn more about \"Rotator Cuff Rehabilitation. \"  Current as of: November 29, 2017  Content Version: 11.7  © 8627-7463 [employer]. Care instructions adapted under license by [employer] (which disclaims liability or warranty for this information). If you have questions about a medical condition or this instruction, always ask your healthcare professional. [address] any warranty or liability for your use of this information.

## 2018-08-10 NOTE — PROGRESS NOTES
HPI Comments:   Here for left shoulder pain onset approx 10 days ago. Did have a fall approx 2 weeks ago hitting his shoulder. No immediate pain but over following days seemed to develop. Pain 1/10 at rest. 5/10 with raising arm overhead or reaching behind back. No weakness, numbness or tingling. Has taken ibuprofen 1 or 2 times with minimal relief. Overall not improving much since onset. Denies prior injury to this shoulder. Patient is a 76 y.o. male presenting with shoulder pain. Shoulder Pain          Past Medical History:   Diagnosis Date    Essential hypertension     Hyperlipidemia         History reviewed. No pertinent surgical history. History reviewed. No pertinent family history. Social History     Social History    Marital status:      Spouse name: N/A    Number of children: N/A    Years of education: N/A     Occupational History    Not on file. Social History Main Topics    Smoking status: Never Smoker    Smokeless tobacco: Never Used    Alcohol use Not on file    Drug use: Not on file    Sexual activity: Not on file     Other Topics Concern    Not on file     Social History Narrative                ALLERGIES: Review of patient's allergies indicates no known allergies. Review of Systems    Vitals:    08/10/18 1612   BP: 146/75   Pulse: (!) 50   Resp: 16   Temp: 97.7 °F (36.5 °C)   SpO2: 98%   Weight: 175 lb 14.4 oz (79.8 kg)   Height: 6' (1.829 m)       Physical Exam   Constitutional: He is oriented to person, place, and time. No distress. HENT:   Mouth/Throat: Oropharynx is clear and moist.   Eyes: Conjunctivae and EOM are normal. Pupils are equal, round, and reactive to light. Cardiovascular: Regular rhythm, normal heart sounds and intact distal pulses. Exam reveals no gallop and no friction rub. No murmur heard.   Apical pulse 56bpm   Pulmonary/Chest: Effort normal and breath sounds normal.   Musculoskeletal:        Left shoulder: He exhibits normal range of motion, no tenderness, no bony tenderness, no swelling, no effusion, no crepitus, no deformity, no laceration, no pain, no spasm, normal pulse and normal strength.   + empty can test  + lift off test  + barreto  Full and equal  strength. Normal sensation to UE with light touch. Lymphadenopathy:     He has no cervical adenopathy. Neurological: He is alert and oriented to person, place, and time. No cranial nerve deficit. Skin: Skin is warm and dry. No rash noted. He is not diaphoretic. Psychiatric: He has a normal mood and affect. His behavior is normal. Thought content normal.       MDM     Differential Diagnosis; Clinical Impression; Plan:       CLINICAL IMPRESSION:  (S46.009A) Rotator cuff injury, initial encounter  (primary encounter diagnosis)  (M25.512) Acute pain of left shoulder    Orders Placed This Encounter      XR SHOULDER LT AP/LAT MIN 2 V    Plan:  1. No acute process on x ray  2. Exam suggests rotator cuff etiology  3. Advised follow up with [address] [place] [address] without improvement over next 1-2 weeks  4. Home exercises given. We have reviewed concerning signs/symptoms, normal vs abnormal progression of medical condition and when to seek immediate medical attention. Schedule with PCP or Urgent Care immediately for worsening or new symptoms. XR Results (most recent):  Results from Appointment encounter on 08/10/18    XR SHOULDER LT AP/LAT MIN 2 V    Narrative  EXAM:  XR SHOULDER LT AP/LAT MIN 2 V    INDICATION:   Left shoulder pain after fall. COMPARISON: None. FINDINGS: Three views of the left shoulder demonstrate no fracture, dislocation  or other acute abnormality. Impression  IMPRESSION:  No acute abnormality.                 Procedures

## 2018-09-13 ENCOUNTER — OFFICE VISIT (OUTPATIENT)
Dept: INTERNAL MEDICINE CLINIC | Age: 78
End: 2018-09-13

## 2018-09-13 VITALS
SYSTOLIC BLOOD PRESSURE: 165 MMHG | BODY MASS INDEX: 23.77 KG/M2 | RESPIRATION RATE: 16 BRPM | WEIGHT: 175.5 LBS | HEART RATE: 60 BPM | DIASTOLIC BLOOD PRESSURE: 83 MMHG | TEMPERATURE: 97.9 F | HEIGHT: 72 IN | OXYGEN SATURATION: 98 %

## 2018-09-13 DIAGNOSIS — M75.52 BURSITIS OF LEFT SHOULDER: Primary | ICD-10-CM

## 2018-09-13 RX ORDER — NAPROXEN 500 MG/1
500 TABLET ORAL 2 TIMES DAILY WITH MEALS
Qty: 30 TAB | Refills: 0 | Status: SHIPPED | OUTPATIENT
Start: 2018-09-13 | End: 2018-09-13 | Stop reason: SDUPTHER

## 2018-09-13 RX ORDER — NAPROXEN 500 MG/1
TABLET ORAL
Qty: 180 TAB | Refills: 0 | Status: SHIPPED | OUTPATIENT
Start: 2018-09-13 | End: 2018-11-19

## 2018-09-13 NOTE — MR AVS SNAPSHOT
[address] [facility]. [place] [address] [id number] 
[phone] Patient: Tramaine Buchanan MRN: [id number] Visit Information Date & Time Provider Department Dept. Phone Encounter #  
 9/13/2018 10:30 AM Cheral Boxer, MD [employer] [facility] [place] [address] [id number] Follow-up Instructions Return in about 4 weeks (around 10/11/2018) for left shoulder pain. Follow-up and Disposition History Upcoming Health Maintenance Date Due DTaP/Tdap/Td series (1 - Tdap) 12/14/1959 ZOSTER VACCINE AGE 60> 10/14/1998 GLAUCOMA SCREENING Q2Y 12/14/2003 Pneumococcal 65+ Low/Medium Risk (1 of 2 - PCV13) 12/14/2003 MEDICARE YEARLY EXAM 3/20/2018 Influenza Age 5 to Adult 8/1/2018 Allergies as of 9/13/2018  Review Complete On: 9/13/2018 By: Cheral Boxer, MD  
 No Known Allergies Current Immunizations  Never Reviewed No immunizations on file. Not reviewed this visit You Were Diagnosed With   
  
 Codes Comments Bursitis of left shoulder    -  Primary ICD-10-CM: M75.52 
ICD-9-CM: 726.10 Vitals BP Pulse Temp Resp Height(growth percentile) Weight(growth percentile) 165/83 (BP 1 Location: Right arm, BP Patient Position: Sitting) 60 97.9 °F (36.6 °C) (Oral) 16 6' (1.829 m) 175 lb 8 oz (79.6 kg) SpO2 BMI Smoking Status 98% 23.8 kg/m2 Never Smoker Vitals History BMI and BSA Data Body Mass Index Body Surface Area  
 23.8 kg/m 2 2.01 m 2 Preferred Pharmacy Pharmacy Name Phone [facility], [address] [place] [address] [address] [phone] Your Updated Medication List  
  
   
This list is accurate as of 9/13/18 11:01 AM.  Always use your most recent med list.  
  
  
  
  
 dutasteride 0.5 mg capsule Commonly known as:  AVODART  
  
 finasteride 5 mg tablet Commonly known as:  PROSCAR  
 Take 5 mg by mouth daily. glucosamine-chondroitin 500-400 mg Cap Commonly known as:  20 [facility] Take 1 Tab by mouth.  
  
 latanoprost 0.005 % ophthalmic solution Commonly known as:  Beni Morgan Administer 1 Drop to both eyes nightly. losartan 50 mg tablet Commonly known as:  COZAAR Take  by mouth daily. multivitamin tablet Commonly known as:  ONE A DAY Take 1 Tab by mouth daily. naproxen 500 mg tablet Commonly known as:  NAPROSYN Take 1 Tab by mouth two (2) times daily (with meals). omeprazole 20 mg capsule Commonly known as:  PRILOSEC Take 20 mg by mouth daily. PRAVACHOL 40 mg tablet Generic drug:  pravastatin Take 40 mg by mouth nightly. SUMAtriptan 50 mg tablet Commonly known as:  IMITREX Take 50 mg by mouth once as needed. Prescriptions Sent to Pharmacy Refills  
 naproxen (NAPROSYN) 500 mg tablet 0 Sig: Take 1 Tab by mouth two (2) times daily (with meals). Class: Normal  
 Pharmacy: [facility] [address] Ph #: [phone] Route: Oral  
  
Follow-up Instructions Return in about 4 weeks (around 10/11/2018) for left shoulder pain. Introducing [facility]! [employer] introduces Raidarrr patient portal. Now you can access parts of your medical record, email your doctor's office, and request medication refills online. 1. In your internet browser, go to [email or web address] 2. Click on the First Time User? Click Here link in the Sign In box. You will see the New Member Sign Up page. 3. Enter your Raidarrr Access Code exactly as it appears below. You will not need to use this code after youve completed the sign-up process. If you do not sign up before the expiration date, you must request a new code. · Raidarrr Access Code: [email or web address] Expires: 11/8/2018  4:51 PM 
 
 4. Enter the last four digits of your Social Security Number (xxxx) and Date of Birth (mm/dd/yyyy) as indicated and click Submit. You will be taken to the next sign-up page. 5. Create a NaturVention ID. This will be your NaturVention login ID and cannot be changed, so think of one that is secure and easy to remember. 6. Create a NaturVention password. You can change your password at any time. 7. Enter your Password Reset Question and Answer. This can be used at a later time if you forget your password. 8. Enter your e-mail address. You will receive e-mail notification when new information is available in [address] 9. Click Sign Up. You can now view and download portions of your medical record. 10. Click the Download Summary menu link to download a portable copy of your medical information. If you have questions, please visit the Frequently Asked Questions section of the NaturVention website. Remember, NaturVention is NOT to be used for urgent needs. For medical emergencies, dial 911. Now available from your iPhone and Android! Please provide this summary of care documentation to your next provider. Your primary care clinician is listed as Debbi Handy. If you have any questions after today's visit, please call [phone].

## 2018-09-13 NOTE — PROGRESS NOTES
Chief Complaint Patient presents with  Shoulder Pain  
 
he is a 76y.o. year old male who presents for evaluation of left shoulder Pain Assessment Encounter Barb DOUGLASS Dewayne 9/13/2018 Onset of Symptoms: a couple months 
________________________________________________________________________ Description: Patient states that his shoulder is a steady ache. Patient states that he is not able to lift his arm without the pain increasing Frequency: more than 5 times a day Pain Scale:(1-10): 6 Trauma Hx: none Hx of similar symptoms: Yes Radiation: No 
Duration:  continuous Progression: is unchanged What makes it better?: ice, OTC meds and rest 
What makes it worse?: lifting or certain movements Medications tried: acetaminophen, ibuprofen Reviewed and agree with Nurse Note and duplicated in this note. Reviewed PmHx, RxHx, FmHx, SocHx, AllgHx and updated and dated in the chart. History reviewed. No pertinent family history. Past Medical History:  
Diagnosis Date  Essential hypertension  Hyperlipidemia Social History Social History  Marital status:  Spouse name: N/A  
 Number of children: N/A  
 Years of education: N/A Social History Main Topics  Smoking status: Never Smoker  Smokeless tobacco: Never Used  Alcohol use Yes  Drug use: No  
 Sexual activity: Yes Other Topics Concern  None Social History Narrative Review of Systems - negative except as listed above Objective:  
 
Vitals:  
 09/13/18 1035 BP: 165/83 Pulse: 60 Resp: 16 Temp: 97.9 °F (36.6 °C) TempSrc: Oral  
SpO2: 98% Weight: 175 lb 8 oz (79.6 kg) Height: 6' (1.829 m) Physical Examination: General appearance - alert, well appearing, and in no distress Back exam - full range of motion, no tenderness, palpable spasm or pain on motion Neurological - alert, oriented, normal speech, no focal findings or movement disorder noted Musculoskeletal - The right shoulder is  normal to inspection. The patient has diminished range with pain  . The shoulderis tender to palpation laterally. Bicep tendon: non-tender The NEER test is positive. The Brady test:is positive The Cross over test:is  negative. The Empty Can test:is  negative. Stressed ext rotation is:  negative. Stressed int rotation: negative. The Apprehension Sign is negative. The Lift off test is:  negative Examination reveals the Painful Arch:  negative. The Labral Test is:  negative. The Sulcus Sign is:  negative. Extremities - peripheral pulses normal, no pedal edema, no clubbing or cyanosis Skin - normal coloration and turgor, no rashes, no suspicious skin lesions noted Assessment/ Plan:  
Diagnoses and all orders for this visit: 1. Bursitis of left shoulder Other orders 
-     naproxen (NAPROSYN) 500 mg tablet; Take 1 Tab by mouth two (2) times daily (with meals). Consider home physical therapy first and physical therapy if he does not get any better Pathophysiology, recovery and rehabilitation process discussed and questions answered Counseling for 30 Minutes of the total visit duration Pictures and figures used as necessary Provided reassurance Shoulder Tubing Exercises reviewed and Theraband provided Monitor response to home exercises Recommend activity modification Recommend  lower impact activities-walking, Eliptical, [employer], cycling or swimming Follow up in 4 week(s) Patient was informed/counseled on: Body mass index is 23.8 kg/(m^2). 1) Remember to stay active and/or exercise regularly (I suggest 30-45 minutes daily) 2) For reliable dietary information, go to www. EATRIGHT.org. You may wish to consider seeing the nutritionist at [facility] [phone], also consider the [address]  
 
 
I have discussed the diagnosis with the patient and the intended plan as seen in the above orders. The patient has received an after-visit summary and questions were answered concerning future plans. Medication Side Effects and Warnings were discussed with patient: yes Patient Labs were reviewed and or requested: yes Patient Past Records were reviewed and or requested  yes I have discussed the diagnosis with the patient and the intended plan as seen in the above orders. The patient has received an after-visit summary and questions were answered concerning future plans. Pt agrees to call or return to clinic and/or go to closest ER with any worsening of symptoms. This may include, but not limited to increased fever (>100.4) with NSAIDS or Tylenol, increased edema, confusion, rash, worsening of presenting symptoms.

## 2018-11-10 ENCOUNTER — OFFICE VISIT (OUTPATIENT)
Dept: URGENT CARE | Age: 78
End: 2018-11-10

## 2018-11-10 VITALS
HEART RATE: 65 BPM | WEIGHT: 172 LBS | OXYGEN SATURATION: 99 % | RESPIRATION RATE: 18 BRPM | HEIGHT: 73 IN | TEMPERATURE: 97.6 F | DIASTOLIC BLOOD PRESSURE: 75 MMHG | BODY MASS INDEX: 22.8 KG/M2 | SYSTOLIC BLOOD PRESSURE: 145 MMHG
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
## 2018-11-10 DIAGNOSIS — S61.411A LACERATION OF RIGHT HAND WITHOUT FOREIGN BODY, INITIAL ENCOUNTER: ICD-10-CM

## 2018-11-10 DIAGNOSIS — T07.XXXA ABRASIONS OF MULTIPLE SITES: ICD-10-CM

## 2018-11-10 RX ORDER — CEPHALEXIN 500 MG/1
500 CAPSULE ORAL 4 TIMES DAILY
Qty: 40 CAP | Refills: 0 | Status: SHIPPED | OUTPATIENT
Start: 2018-11-10 | End: 2018-11-19

## 2018-11-10 NOTE — PROGRESS NOTES
TDAP and right hand xray VORB. After obtaining consent, and per orders of Dr. Genna Chaidez, injection of TDAP given by Sebastian Montes LPN. Patient instructed to remain in clinic for 20 minutes afterwards, and to report any adverse reaction to me immediately.

## 2018-11-10 NOTE — PATIENT INSTRUCTIONS
Scrapes (Abrasions): Care Instructions Your Care Instructions Scrapes (abrasions) are wounds where your skin has been rubbed or torn off. Most scrapes do not go deep into the skin, but some may remove several layers of skin. Scrapes usually don't bleed much, but they may ooze pinkish fluid. Scrapes on the head or face may appear worse than they are. They may bleed a lot because of the good blood supply to this area. Most scrapes heal well and may not need a bandage. They usually heal within 3 to 7 days. A large, deep scrape may take 1 to 2 weeks or longer to heal. A scab may form on some scrapes. Follow-up care is a key part of your treatment and safety. Be sure to make and go to all appointments, and call your doctor if you are having problems. It's also a good idea to know your test results and keep a list of the medicines you take. How can you care for yourself at home? · If your doctor told you how to care for your wound, follow your doctor's instructions. If you did not get instructions, follow this general advice: 
? Wash the scrape with clean water 2 times a day. Don't use hydrogen peroxide or alcohol, which can slow healing. ? You may cover the scrape with a thin layer of petroleum jelly, such as Vaseline, and a nonstick bandage. ? Apply more petroleum jelly and replace the bandage as needed. · Prop up the injured area on a pillow anytime you sit or lie down during the next 3 days. Try to keep it above the level of your heart. This will help reduce swelling. · Be safe with medicines. Take pain medicines exactly as directed. ? If the doctor gave you a prescription medicine for pain, take it as prescribed. ? If you are not taking a prescription pain medicine, ask your doctor if you can take an over-the-counter medicine. When should you call for help? Call your doctor now or seek immediate medical care if: 
  · You have signs of infection, such as: ? Increased pain, swelling, warmth, or redness around the scrape. ? Red streaks leading from the scrape. ? Pus draining from the scrape. ? A fever.  
  · The scrape starts to bleed, and blood soaks through the bandage. Oozing small amounts of blood is normal.  
 Watch closely for changes in your health, and be sure to contact your doctor if the scrape is not getting better each day. Where can you learn more? Go to http://maria ines-katherine.info/. Enter A374 in the search box to learn more about \"Scrapes (Abrasions): Care Instructions. \" Current as of: November 20, 2017 Content Version: 11.8 © 3113-2312 [employer]. Care instructions adapted under license by [employer] (which disclaims liability or warranty for this information). If you have questions about a medical condition or this instruction, always ask your healthcare professional. [place] [address] any warranty or liability for your use of this information. Cuts on the Hand Closed With Stitches: Care Instructions Your Care Instructions A cut on your hand can be on your fingers, your thumb, or the front or back of your hand. Sometimes a cut can injure the tendons, blood vessels, or nerves of your hand. The doctor used stitches to close the cut. Using stitches also helps the cut heal and reduces scarring. The doctor may have given you a splint to help prevent you from moving your hand, fingers, or thumb. If the cut went deep and through the skin, the doctor put in two layers of stitches. The deeper layer brings the deep part of the cut together. These stitches will dissolve and don't need to be removed. The stitches in the upper layer are the ones you see on the cut. You will probably have a bandage. You will need to have the stitches removed, usually in 7 to 14 days.  The doctor may suggest that you see a hand specialist if the cut is very deep or if you have trouble moving your fingers or have less feeling in your hand. The doctor has checked you carefully, but problems can develop later. If you notice any problems or new symptoms, get medical treatment right away. Follow-up care is a key part of your treatment and safety. Be sure to make and go to all appointments, and call your doctor if you are having problems. It's also a good idea to know your test results and keep a list of the medicines you take. How can you care for yourself at home? · Keep the cut dry for the first 24 to 48 hours. After this, you can shower if your doctor okays it. Pat the cut dry. · Don't soak the cut, such as in a bathtub. Your doctor will tell you when it's safe to get the cut wet. · If your doctor told you how to care for your cut, follow your doctor's instructions. If you did not get instructions, follow this general advice: ? After the first 24 to 48 hours, wash around the cut with clean water 2 times a day. Don't use hydrogen peroxide or alcohol, which can slow healing. ? You may cover the cut with a thin layer of petroleum jelly, such as Vaseline, and a nonstick bandage. ? Apply more petroleum jelly and replace the bandage as needed. · Prop up the sore hand on a pillow anytime you sit or lie down during the next 3 days. Try to keep it above the level of your heart. This will help reduce swelling. · Avoid any activity that could cause your cut to reopen. · Do not remove the stitches on your own. Your doctor will tell you when to come back to have the stitches removed. · Be safe with medicines. Take pain medicines exactly as directed. ? If the doctor gave you a prescription medicine for pain, take it as prescribed. ? If you are not taking a prescription pain medicine, ask your doctor if you can take an over-the-counter medicine. When should you call for help? Call your doctor now or seek immediate medical care if:   · You have new pain, or your pain gets worse.  
  · The skin near the cut is cold or pale or changes color.  
  · You have tingling, weakness, or numbness near the cut.  
  · The cut starts to bleed, and blood soaks through the bandage. Oozing small amounts of blood is normal.  
  · You have trouble moving the area of the hand near the cut.  
  · You have symptoms of infection, such as: 
? Increased pain, swelling, warmth, or redness around the cut. 
? Red streaks leading from the cut. 
? Pus draining from the cut. 
? A fever.  
 Watch closely for changes in your health, and be sure to contact your doctor if: 
  · You do not get better as expected. Where can you learn more? Go to http://maria ines-katherine.info/. Enter T250 in the search box to learn more about \"Cuts on the Hand Closed With Stitches: Care Instructions. \" Current as of: November 20, 2017 Content Version: 11.8 © 1662-4007 [employer]. Care instructions adapted under license by [employer] (which disclaims liability or warranty for this information). If you have questions about a medical condition or this instruction, always ask your healthcare professional. [address] any warranty or liability for your use of this information.

## 2018-11-12 NOTE — PROGRESS NOTES
Laceration This is a new problem. Episode onset: PTA- h/o fall when running marathon - tripped / fell on ground  Pertinent negatives include no headaches. Associated symptoms comments: Superficial abrasions- no head injury- no LOC . Nothing aggravates the symptoms. Nothing relieves the symptoms. He has tried nothing for the symptoms. Past Medical History:  
Diagnosis Date  Essential hypertension  Hyperlipidemia History reviewed. No pertinent surgical history. History reviewed. No pertinent family history. Social History Socioeconomic History  Marital status:  Spouse name: Not on file  Number of children: Not on file  Years of education: Not on file  Highest education level: Not on file Social Needs  Financial resource strain: Not on file  Food insecurity - worry: Not on file  Food insecurity - inability: Not on file  Transportation needs - medical: Not on file  Transportation needs - non-medical: Not on file Occupational History  Not on file Tobacco Use  Smoking status: Never Smoker  Smokeless tobacco: Never Used Substance and Sexual Activity  Alcohol use: Yes  Drug use: No  
 Sexual activity: Yes Other Topics Concern  Not on file Social History Narrative  Not on file ALLERGIES: Patient has no known allergies. Review of Systems Skin: Positive for wound (rt hand). Neurological: Negative for headaches. All other systems reviewed and are negative. Vitals:  
 11/10/18 1756 BP: 145/75 Pulse: 65 Resp: 18 Temp: 97.6 °F (36.4 °C) SpO2: 99% Weight: 172 lb (78 kg) Height: 6' 1\" (1.854 m) Physical Exam  
HENT:  
Head: Head is without contusion and without laceration. Musculoskeletal: Normal range of motion. He exhibits no tenderness or deformity. Right hand: He exhibits laceration (large skin flap  ).  He exhibits normal range of motion, no bony tenderness and no swelling. Normal sensation noted. Normal strength noted. Hands: 
Skin: Abrasion (multiple - on face/ nose and rt arm- superficial ) noted. MDM Wound Repair Performed by: attendingPreparation: skin prepped with Lonnie Location details: right hand Wound length:2.6 - 7.5 cm Anesthesia: local infiltration Anesthesia: 
Local Anesthetic: lidocaine 1% with epinephrine Anesthetic total: 10 mL Irrigation solution: saline Skin closure: 5-0 nylon Number of sutures: 10 Dressing: 4x4, antibiotic ointment and gauze packing Patient tolerance: Patient tolerated the procedure well with no immediate complications My total time at bedside, performing this procedure was 31-45 minutes. ICD-10-CM ICD-9-CM 1. Fall, initial encounter [address]. XXXA E888.9 XR HAND RT MIN 3 V  
   TETANUS, DIPHTHERIA TOXOIDS AND ACELLULAR PERTUSSIS VACCINE (TDAP), IN INDIVIDS. >=7, IM 2. Laceration of right hand without foreign body, initial encounter S61.411A 882.0   
 on lateral dorsal surface 3. Abrasions of multiple sites T07. XXXA 919.0   
 face, nose, rt arm Wound care Medications Ordered Today Medications  cephALEXin (KEFLEX) 500 mg capsule Sig: Take 1 Cap by mouth four (4) times daily for 10 days. Dispense:  40 Cap Refill:  0 Results for orders placed or performed in visit on 11/10/18 XR HAND RT MIN 3 V Narrative EXAM:  XR HAND RT MIN 3 V 
 
INDICATION:  fall. COMPARISON: None. FINDINGS: Three views of the right hand demonstrate degenerative changes first 
metacarpal carpal and scaphoid trapezium/trapezoid joints. .  No acute fracture 
identified. Wilfrido Vang Impression IMPRESSION:  No acute fracture. There are degenerative changes first metacarpal 
carpal joint. Wilfrido Vang The patients condition was discussed with the patient and they understand. The patient is to follow up with primary care doctor.   If signs and symptoms become worse the pt is to go to the ER. The patient is to take medications as prescribed.

## 2018-11-19 ENCOUNTER — OFFICE VISIT (OUTPATIENT)
Dept: URGENT CARE | Age: 78
End: 2018-11-19

## 2018-11-19 VITALS
HEART RATE: 60 BPM | SYSTOLIC BLOOD PRESSURE: 141 MMHG | TEMPERATURE: 97.3 F | OXYGEN SATURATION: 98 % | RESPIRATION RATE: 18 BRPM | DIASTOLIC BLOOD PRESSURE: 79 MMHG

## 2018-11-19 DIAGNOSIS — Z48.02 ENCOUNTER FOR REMOVAL OF SUTURES: Primary | ICD-10-CM

## 2018-11-19 NOTE — PROGRESS NOTES
Suture Removal  
This is a new problem. The current episode started more than 1 week ago. Past Medical History:  
Diagnosis Date  Essential hypertension  Hyperlipidemia History reviewed. No pertinent surgical history. History reviewed. No pertinent family history. Social History Socioeconomic History  Marital status:  Spouse name: Not on file  Number of children: Not on file  Years of education: Not on file  Highest education level: Not on file Social Needs  Financial resource strain: Not on file  Food insecurity - worry: Not on file  Food insecurity - inability: Not on file  Transportation needs - medical: Not on file  Transportation needs - non-medical: Not on file Occupational History  Not on file Tobacco Use  Smoking status: Never Smoker  Smokeless tobacco: Never Used Substance and Sexual Activity  Alcohol use: Yes  Drug use: No  
 Sexual activity: Yes Other Topics Concern  Not on file Social History Narrative  Not on file ALLERGIES: Patient has no known allergies. Review of Systems All other systems reviewed and are negative. Vitals:  
 11/19/18 1326 BP: 141/79 Pulse: 60 Resp: 18 Temp: 97.3 °F (36.3 °C) SpO2: 98% Physical Exam  
Musculoskeletal:  
     Left hand: He exhibits laceration (healed with ovelying scab- no infection/ gaping or drainage ). Nursing note and vitals reviewed. MDM Suture/Staple Removal 
Date/Time: 11/19/2018 1:55 PM 
Performed by: Dheeraj Hall MD 
Authorized by: Dheeraj Hall MD  
Patient tolerance: Patient tolerated the procedure well with no immediate complications Body area: upper extremity Location details: right hand Wound Appearance: clean Sutures Removed: 10 Post-removal: Steri-Strips applied Facility: sutures placed in this facility My total time at bedside, performing this procedure was 1-15 minutes.

## 2019-12-03 ENCOUNTER — OFFICE VISIT (OUTPATIENT)
Dept: RHEUMATOLOGY | Age: 79
End: 2019-12-03

## 2019-12-03 VITALS
OXYGEN SATURATION: 96 % | TEMPERATURE: 97.8 F | DIASTOLIC BLOOD PRESSURE: 89 MMHG | BODY MASS INDEX: 23.25 KG/M2 | HEART RATE: 57 BPM | RESPIRATION RATE: 16 BRPM | WEIGHT: 176.2 LBS | SYSTOLIC BLOOD PRESSURE: 143 MMHG

## 2019-12-03 DIAGNOSIS — M51.37 DDD (DEGENERATIVE DISC DISEASE), LUMBOSACRAL: Primary | ICD-10-CM

## 2019-12-03 RX ORDER — BISMUTH SUBSALICYLATE 262 MG
1 TABLET,CHEWABLE ORAL
COMMUNITY

## 2019-12-03 RX ORDER — CYCLOBENZAPRINE HCL 10 MG
TABLET ORAL
Refills: 0 | COMMUNITY
Start: 2019-11-27

## 2019-12-03 NOTE — PROGRESS NOTES
CHIEF COMPLAINT  The patient was sent for rheumatology consultation by Dr. Julio Bustos MD for evaluation of the lumbar spine pain. HISTORY OF PRESENT ILLNESS  This is a [de-identified] y.o.  male. Today, the patient complains of pain in the joints. Location: lumbar spine  Severity:  2 on a scale of 0-10  Timing: intermittent   Duration:  One week  Modifying factors: NA  Context/Associated signs and symptoms: today he reports that he has been having lower back pain at multiple times during his lifetime, especially when he was an . One week ago he was getting out of bed in the middle of the night and at that time his lower back muscles spasmed. Was [facility] at [facility] and was prescribed with muscle relaxant and a steroid. X-rays of the lumbar spine were taken that revealed some osteoarthritic changes. Denies morning stiffness.      RHEUMATOLOGY REVIEW OF SYSTEMS   Positives as per HPI  Negatives as follows:  Vicci Lout:  Denies unexplained persistent fevers, weight change, chronic fatigue  HEAD/EYES:   Denies eye redness, blurry vision or sudden loss of vision, dry eyes, HA, temporal artery pain  ENT:    Denies oral/nasal ulcers, recurrent sinus infections, dry mouth  RESPIRATORY:  No pleuritic pain, history of pleural effusions, hemoptysis, exertional dyspnea  CARDIOVASCULAR:  Denies chest pain, history of pericardial effusions  GASTRO:   Denies heartburn, esophageal dysmotility, abdominal pain, nausea, vomiting, diarrhea, blood in the stool  HEMATOLOGIC:  No easy bruising, purpura, swollen lymph nodes  SKIN:    Denies alopecia, ulcers, nodules, sun sensitivity, unexplained persistent rash   VASCULAR:   Denies edema, cyanosis, raynaud phenomenon  NEUROLOGIC:  Denies specific muscle weakness, paresthesias   PSYCHIATRIC:  No sleep disturbance / snoring, depression, anxiety  MSK:    No morning stiffness >1 hour, SI joint pain, persistent joint swelling, persistent joint pain    MEDICAL AND SOCIAL HISTORY  This was reviewed with the patient and reviewed in the medical records. Past Medical History:   Diagnosis Date    Essential hypertension     Hyperlipidemia      No past surgical history on file. Social History     Tobacco Use    Smoking status: Never Smoker    Smokeless tobacco: Never Used   Substance Use Topics    Alcohol use: Yes    Drug use: No     Employment - Retired  Sleep - Good, no issues  Exercise - yes    FAMILY HISTORY  No autoimmune disease in 1st degree relatives     MEDICATIONS  All the current medications were reviewed in detail. PHYSICAL EXAM  Blood pressure 143/89, pulse (!) 57, temperature 97.8 °F (36.6 °C), temperature source Oral, resp. rate 16, weight 176 lb 3.2 oz (79.9 kg), SpO2 96 %. GENERAL APPEARANCE: Well-nourished adult in no acute distress. EYES: No scleral erythema, conjunctival injection. ENT: No oral ulcer, parotid enlargement. NECK: No adenopathy, thyroid enlargement. CARDIOVASCULAR: Heart rhythm is regular. No murmur, rub, gallop. CHEST: Normal vesicular breath sounds. No wheezes, rales, pleural friction rubs. ABDOMINAL: The abdomen is soft and nontender. Liver and spleen are nonpalpable. Bowel sounds are normal.  EXTREMITIES: There is no evidence of clubbing, cyanosis, edema. SKIN: No rash, palpable purpura, digital ulcer, abnormal thickening. NEUROLOGICAL: Normal gait and station, full strength in upper and lower extremities, normal sensation to light touch. MUSCULOSKELETAL:   Upper extremities - full range of motion, no tenderness, no swelling, no synovial thickening and no deformity of joints. OA changes   Lower extremities - full range of motion, no tenderness, no swelling, no synovial thickening and no deformity of joints.  Pain in the lumbar area      LABS, RADIOLOGY AND PROCEDURES  Previous labs reviewed -Yes  Previous radiology reviewed -Yes  Previous procedures reviewed -Yes  Previous medical records reviewed/summarized -Yes  X-ray lumbar spine 11/27/2019: multilevel degenerative changes noted in the lumbar spine and cervical spine      ASSESSMENT  1. Back strain - This is usually preceded by atraumatic event (coughing, sneezing or lifting heavy objects) and is acute in nature. Pain usually radiates up the ipsilateral paraspinous muscles, across the lumbar area. The pain can radiate to the buttocks as well. Controlled physical activity, NSAID's and muscle relaxer's can help. No evidence of rheumatoid arthritis. PLAN  1. OTC medications as needed  2. Return as needed    No orders of the defined types were placed in this encounter. Jose Guadalupe Bardales MD  Adult and Pediatric Rheumatology     [facility] and [address] [facility], [address] [facility], Phone [phone], Fax [phone]   E-mail: [email or web address]    Visiting  of Pediatrics    Department of Pediatrics, [facility] of [address] [place] [address], [address] [place] [address], Phone [phone], Fax [phone]  E-mail: [email or web address]    There are no Patient Instructions on file for this visit. Scribed by Trinity Failing, as dictated by Cathi Joyner.  Booker Bardales MD    cc:  Cruzito Gao MD

## 2019-12-03 NOTE — PROGRESS NOTES
Chief Complaint   Patient presents with    Joint Pain     1. Have you been to the ER, urgent care clinic since your last visit? Hospitalized since your last visit? Yes [facility]    2. Have you seen or consulted any other health care providers outside of the [address] [place] [address] since your last visit? Include any pap smears or colon screening. No

## 2023-10-02 ENCOUNTER — OFFICE VISIT (OUTPATIENT)
Age: 83
End: 2023-10-02

## 2023-10-02 VITALS
RESPIRATION RATE: 18 BRPM | HEART RATE: 50 BPM | WEIGHT: 182.8 LBS | SYSTOLIC BLOOD PRESSURE: 139 MMHG | OXYGEN SATURATION: 97 % | TEMPERATURE: 97.8 F | DIASTOLIC BLOOD PRESSURE: 64 MMHG

## 2023-10-02 DIAGNOSIS — T16.1XXA FOREIGN BODY OF RIGHT EAR, INITIAL ENCOUNTER: Primary | ICD-10-CM

## 2023-10-02 RX ORDER — PRAVASTATIN SODIUM 40 MG
1 TABLET ORAL DAILY
COMMUNITY
Start: 2017-09-11

## 2023-10-02 RX ORDER — SUMATRIPTAN 50 MG/1
50 TABLET, FILM COATED ORAL
COMMUNITY
Start: 2017-08-14

## 2023-10-02 RX ORDER — AMLODIPINE AND VALSARTAN 5; 160 MG/1; MG/1
1 TABLET ORAL DAILY
COMMUNITY
Start: 2023-07-18

## 2023-10-02 RX ORDER — SODIUM PHOSPHATE,MONO-DIBASIC 19G-7G/118
1 ENEMA (ML) RECTAL DAILY
COMMUNITY

## 2023-10-02 RX ORDER — LOSARTAN POTASSIUM 50 MG/1
TABLET ORAL
COMMUNITY
Start: 2019-07-01

## 2023-10-02 RX ORDER — DUTASTERIDE 0.5 MG/1
0.5 CAPSULE, LIQUID FILLED ORAL DAILY
COMMUNITY
Start: 2017-09-18

## 2023-10-02 RX ORDER — OMEPRAZOLE 40 MG/1
40 CAPSULE, DELAYED RELEASE ORAL DAILY
COMMUNITY

## 2023-10-02 NOTE — PROGRESS NOTES
Subjective: (As above and below)     The patient/guardian gave verbal consent to treat. Chief Complaint   Patient presents with    New Patient    Ear Problem     Patient presents for bilateral ear fullness x 1 day. Patient went to have a hearing test for hearing aids and was advised that they could not see down to his ear drum      Emmett Vidal is a 78 y.o. male who presents for evaluation of : right ear clogged  Hearing aid fitting said something was in canal.  Here today for eval  Denies pain or fevers  + decrease in hearing        Review of Systems    Review of Systems - negative except as listed above    Reviewed PmHx, RxHx, FmHx, SocHx, AllgHx and updated in chart. History reviewed. No pertinent family history. Past Medical History:   Diagnosis Date    Essential hypertension     Hyperlipidemia       Social History     Socioeconomic History    Marital status:      Spouse name: None    Number of children: None    Years of education: None    Highest education level: None   Tobacco Use    Smoking status: Never     Passive exposure: Never    Smokeless tobacco: Never   Vaping Use    Vaping Use: Never used   Substance and Sexual Activity    Alcohol use: Yes     Comment: occ    Drug use: No    Sexual activity: Yes          Current Outpatient Medications   Medication Sig    amLODIPine-valsartan (EXFORGE) 5-160 MG per tablet Take 1 tablet by mouth daily    dutasteride (AVODART) 0.5 MG capsule Take 1 capsule by mouth daily    glucosamine-chondroitin 500-400 MG CAPS Take 1 tablet by mouth daily    losartan (COZAAR) 50 MG tablet TK 1 T PO ONCE A DAY FOR 30 DAYS    omeprazole (PRILOSEC) 40 MG delayed release capsule Take 1 capsule by mouth daily    pravastatin (PRAVACHOL) 40 MG tablet Take 1 tablet by mouth daily    SUMAtriptan (IMITREX) 50 MG tablet Take 1 tablet by mouth once as needed     No current facility-administered medications for this visit.        Objective:     Vitals:    10/02/23 1156   BP:

## 2025-03-20 ENCOUNTER — OFFICE VISIT (OUTPATIENT)
Age: 85
End: 2025-03-20
Payer: MEDICARE

## 2025-03-20 VITALS
OXYGEN SATURATION: 96 % | SYSTOLIC BLOOD PRESSURE: 132 MMHG | BODY MASS INDEX: 25.6 KG/M2 | WEIGHT: 189 LBS | HEART RATE: 56 BPM | DIASTOLIC BLOOD PRESSURE: 80 MMHG | HEIGHT: 72 IN

## 2025-03-20 DIAGNOSIS — E78.00 HYPERCHOLESTEREMIA: ICD-10-CM

## 2025-03-20 DIAGNOSIS — I10 ESSENTIAL HYPERTENSION, BENIGN: Primary | ICD-10-CM

## 2025-03-20 DIAGNOSIS — Z76.89 ESTABLISHING CARE WITH NEW DOCTOR, ENCOUNTER FOR: ICD-10-CM

## 2025-03-20 DIAGNOSIS — R60.0 LOWER EXTREMITY EDEMA: ICD-10-CM

## 2025-03-20 PROCEDURE — 99204 OFFICE O/P NEW MOD 45 MIN: CPT | Performed by: SPECIALIST

## 2025-03-20 PROCEDURE — 93005 ELECTROCARDIOGRAM TRACING: CPT | Performed by: SPECIALIST

## 2025-03-20 RX ORDER — VALSARTAN 160 MG/1
160 TABLET ORAL DAILY
Qty: 90 TABLET | Refills: 3 | Status: SHIPPED | OUTPATIENT
Start: 2025-03-20

## 2025-03-20 ASSESSMENT — PATIENT HEALTH QUESTIONNAIRE - PHQ9
2. FEELING DOWN, DEPRESSED OR HOPELESS: NOT AT ALL
SUM OF ALL RESPONSES TO PHQ QUESTIONS 1-9: 0
1. LITTLE INTEREST OR PLEASURE IN DOING THINGS: NOT AT ALL
SUM OF ALL RESPONSES TO PHQ QUESTIONS 1-9: 0

## 2025-03-20 NOTE — PROGRESS NOTES
1. Have you been to the ER, urgent care clinic since your last visit?  Hospitalized since your last visit?  [facility] ED December 2024    2. Have you seen or consulted any other health care providers outside of the [facility] since your last visit?  Include any pap smears or colon screening.   PCP Dr. Ben Santiago  Dermatology

## 2025-03-20 NOTE — PATIENT INSTRUCTIONS
Stop amlodipine.    Start Valsartan 160mg daily.    After two weeks stop wearing compression stockings.    Check BP twice a week at home. Be sure to make sure you are sitting still for at least five minutes with both feet flat on the floor and arm heart level. Write down the blood pressure, heart rate, date and time. When you have 10-12 numbers send us a [employer] message or call us at [phone] with numbers as well as how you're feeling.     We will see you back for an annual follow up with an echo and follow up.

## 2025-03-20 NOTE — PROGRESS NOTES
Miguel Song     1940       Jose Ca MD, [facility]  Date of Visit-3/20/2025   PCP is Ben Santiago MD   [facility] [place]  [facility]  HPI:  Miguel Song is a 84 y.o. male   New patient from Dr Santiago   We have records   Echo done at [facility] 1/30/2025 essentially normal EF.  Trace AI PI mild TR aortic root is dilated at 3.9 at the sinus of Valsalva no pulmonary hypertension pericardial effusion evidence of grade 1 diastolic dysfunction.  Office note 1/23/2025 reviewed noting bilateral edema lower extremity for 3 months.  Patient is on amlodipine  Labs 12-24 creatinine 0.9 sodium 132 potassium 4.6 sodium had a giuliana of 130 on 10/28/2024.  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  He has no chest pain or shortness of breath but he does have lower extremity edema.  He takes amlodipine additionally takes pravastatin and omeprazole.  He has no drug allergies.  Not have prior cath.  Social history non-smoker drinks one 6 ounce beverage of alcohol weekly 10 ounces of caffeine daily  3 children enjoys reading writing and yard work.  Family history mother passed at 101 of natural causes Father 85.  Unclear if any early CAD in the family.  Review of systems positive for edema hearing difficulties urinary frequency difficulty swallowing.  Medical history includes hypertension and hyperlipidemia on pravastatin   Denies chest pain, edema, syncope or shortness of breath at rest   Has no tachycardia , palpitations or sense of arrythmia             EKG 3/20/2025: Sinus bradycardia RSR prime in V1  Assessment/Plan:     Patient Instructions   Stop amlodipine.    Start Valsartan 160mg daily.    After two weeks stop wearing compression stockings.    Check BP twice a week at home. Be sure to make sure you are sitting still for at least five